# Patient Record
(demographics unavailable — no encounter records)

---

## 2025-01-21 NOTE — PHYSICAL EXAM
[Chaperone Present] : A chaperone was present in the examining room during all aspects of the physical examination [38064] : A chaperone was present during the pelvic exam. [FreeTextEntry2] : Rebeca [FreeTextEntry1] : Void:  100cc  PVR:  10cc  Urethra was prepped in sterile fashion and then a sterile 14F catheter was used by me to drain the bladder for her symptoms of urgency. Patient tolerated the procedure well  GH: 4 PB: 4.5  TVL: 7.5  C: +3  D:  n/a Aa: +3 Ba: +3 Ap: +1  Bp: +1  -empty cough stress test  +atrophy  +urethral caruncle  -vestibular tenderness  +prolapse  +urethral hypermobility  -pelvic floor dysfunction  -urethral tenderness  -bladder tenderness  surgically absent cervix  surgically absent uterus  adnexa nonpalpable  good sphincter tone  -enterocele  decreased rectal squeeze  intact sacral nerves  1/5 Diana

## 2025-01-21 NOTE — REASON FOR VISIT
[TextEntry] : Reason for visit: New Patient Voids per day: 4-6   Voids per night: 2   Urge incontinence: No Stress incontinence: Occasionally Constipation: No Fecal incontinence: Yes Vaginal bulge: Yes

## 2025-01-21 NOTE — ASSESSMENT
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 The patient was counseled regarding the possible natural progression of prolapse and the clinical consequences of worsening prolapse. The stage and the location of the prolapse was reviewed with the patient. She was counseled regarding the management strategies including observation, pelvic floor physical therapy, pessary placement and surgery. As the patient's symptoms are somewhat atypical for prolapse sxs will proceed with a pessary trial to determine if her symptoms improve. She may ultimately be interested in surgical management.  Urinary urgency, frequency, nocturia - Discussed etiology of the condition with the patient. Discussed management options, including first line management options consisting of diet and lifestyle modifications, second line options consisting of medications, and third line options. Reviewed PTNS, bladder botox, and Interstim. Discussed R/B/A of anticholinergics versus b-3 agonists. Patient will start with bladder diet, Getmesa and vaginal estrogen therapy.  Vaginal atrophy - Discussed etiology and treatment options with patient. Discussed R/B/A of estrogen vaginal cream. Patient will start using as follows: Place a pea size (0.5 grams or less) dab of Estrogen vaginal cream using finger (NOT the applicator) into the vagina 3 times a week ( Monday, Wednesday, Friday)

## 2025-01-21 NOTE — HISTORY OF PRESENT ILLNESS
[FreeTextEntry1] : 69 year para 1 ( x1) presents with complaints of vaginal bulge since her hysterectomy in 3/2024. She c/o frequent urination.  Pelvic organ prolapse: + bulge, no pressure/heaviness, duration 6 months  Stress urinary incontinence: no x/week no prior incontinence procedures  Overactive bladder syndrome: daily frequency 4-6 x/day, 2 x/night, occasional urgency,  2-4 x/week UUI episodes, 1 pads/day     Bladder irritants include coffee, tea,    Prior OAB meds no  Voiding dysfunction: no Incomplete bladder emptying, no hesitancy  Lower urinary tract/vaginal symptoms: ? UTIs per year, no hematuria, no dysuria, no bladder pain (no sxs)  7 BM/week   no constipation   Fecal incontinence + (1x/wk) with loose stool  Sexually active no   Pelvic pain no   Vaginal dryness +   LMP age 55   PMB no  PMSH: 3/2024 - Dr. Quiroga ([facility]): LAVH, BSO, perineoplasty (op report scanned into chart)

## 2025-01-21 NOTE — PHYSICAL EXAM
[Chaperone Present] : A chaperone was present in the examining room during all aspects of the physical examination [87077] : A chaperone was present during the pelvic exam. [FreeTextEntry2] : Rebeca [FreeTextEntry1] : Void:  100cc  PVR:  10cc  Urethra was prepped in sterile fashion and then a sterile 14F catheter was used by me to drain the bladder for her symptoms of urgency. Patient tolerated the procedure well  GH: 4 PB: 4.5  TVL: 7.5  C: +3  D:  n/a Aa: +3 Ba: +3 Ap: +1  Bp: +1  -empty cough stress test  +atrophy  +urethral caruncle  -vestibular tenderness  +prolapse  +urethral hypermobility  -pelvic floor dysfunction  -urethral tenderness  -bladder tenderness  surgically absent cervix  surgically absent uterus  adnexa nonpalpable  good sphincter tone  -enterocele  decreased rectal squeeze  intact sacral nerves  1/5 Diana

## 2025-01-21 NOTE — COUNSELING
[FreeTextEntry1] : Please follow up with the physician assistant for a pessary fitting.  Place a pea size (0.5 grams or less) dab of Estrogen vaginal cream using finger (NOT the applicator) into the vagina 3 times a week ( Monday, Wednesday, Friday)  Please start taking Gemtesa for your bladder and let us know if you have any problems picking up the medication from the pharmacy.  For Urgency, Frequency and Urge related incontinence.  Please decrease or stop the use of the followin. Coffee (Caffeinated and decaffeinated)  2. Teas (Caffeinated, decaffeinated, Ice tea, and green teas  3. All sodas (Caffeinated, decaffeinated, energy drinks)  4. All carbonated drinks including seltzer water  5. All citric fruit juices  6. Water with lemon or lime  7. Spicy foods  8. Tomato Sauce based foods  9. Chocolate and chocolate containing products  10. All alcohol

## 2025-01-21 NOTE — PHYSICAL EXAM
[Chaperone Present] : A chaperone was present in the examining room during all aspects of the physical examination [[id number]] : A chaperone was present during the pelvic exam. [FreeTextEntry2] : Rebeca [FreeTextEntry1] : Void:  100cc  PVR:  10cc  Urethra was prepped in sterile fashion and then a sterile 14F catheter was used by me to drain the bladder for her symptoms of urgency. Patient tolerated the procedure well  GH: 4 PB: 4.5  TVL: 7.5  C: +3  D:  n/a Aa: +3 Ba: +3 Ap: +1  Bp: +1  -empty cough stress test  +atrophy  +urethral caruncle  -vestibular tenderness  +prolapse  +urethral hypermobility  -pelvic floor dysfunction  -urethral tenderness  -bladder tenderness  surgically absent cervix  surgically absent uterus  adnexa nonpalpable  good sphincter tone  -enterocele  decreased rectal squeeze  intact sacral nerves  1/5 Diana

## 2025-01-21 NOTE — HISTORY OF PRESENT ILLNESS
[FreeTextEntry1] : 69 year para 1 ( x1) presents with complaints of vaginal bulge since her hysterectomy in 3/2024. She c/o frequent urination.  Pelvic organ prolapse: + bulge, no pressure/heaviness, duration 6 months  Stress urinary incontinence: no x/week no prior incontinence procedures  Overactive bladder syndrome: daily frequency 4-6 x/day, 2 x/night, occasional urgency,  2-4 x/week UUI episodes, 1 pads/day     Bladder irritants include coffee, tea,    Prior OAB meds no  Voiding dysfunction: no Incomplete bladder emptying, no hesitancy  Lower urinary tract/vaginal symptoms: ? UTIs per year, no hematuria, no dysuria, no bladder pain (no sxs)  7 BM/week   no constipation   Fecal incontinence + (1x/wk) with loose stool  Sexually active no   Pelvic pain no   Vaginal dryness +   LMP age 55   PMB no  PMSH: 3/2024 - Dr. Quiroga ([facility]): LAVH, BSO, perineoplasty (op report scanned into chart)

## 2025-01-21 NOTE — HISTORY OF PRESENT ILLNESS
[FreeTextEntry1] : 69 year para 1 ( x1) presents with complaints of vaginal bulge since her hysterectomy in 3/2024. She c/o frequent urination.  Pelvic organ prolapse: + bulge, no pressure/heaviness, duration 6 months  Stress urinary incontinence: no x/week no prior incontinence procedures  Overactive bladder syndrome: daily frequency 4-6 x/day, 2 x/night, occasional urgency,  2-4 x/week UUI episodes, 1 pads/day     Bladder irritants include coffee, tea,    Prior OAB meds no  Voiding dysfunction: no Incomplete bladder emptying, no hesitancy  Lower urinary tract/vaginal symptoms: ? UTIs per year, no hematuria, no dysuria, no bladder pain (no sxs)  7 BM/week   no constipation   Fecal incontinence + (1x/wk) with loose stool  Sexually active no   Pelvic pain no   Vaginal dryness +   LMP age 55   PMB no  PMSH: 3/2024 - Dr. Quiroga ([facility]): LAVH, BSO, perineoplasty (op report scanned into chart)

## 2025-02-18 NOTE — HISTORY OF PRESENT ILLNESS
[de-identified] : 69-year-old female comes in today for an evaluation of her right knee pain that has been going on now for a few days.  No specific injury or trauma. Past medical history hypertension, thyroid disease, high cholesterol, migraines Medications include metoprolol, lovastatin, methimazole, topiramate

## 2025-02-18 NOTE — IMAGING
[de-identified] : On clinical exam of the right knee mild swelling, no ecchymosis, erythema.  Skin is intact.  Tenderness along the medial joint line, no tenderness along the lateral joint line.  Mild tenderness in the posterior knee popliteal fossa.  Calf soft nontender.  Patient is able to straight leg raise.  Crepitus and slight restriction through knee flexion and extension.  X-ray right knee 3 views obtained in the office no obvious fracture or dislocation, mild patellofemoral degenerative change

## 2025-02-18 NOTE — ASSESSMENT
[FreeTextEntry1] : Recommending physical therapy, meloxicam sent to the pharmacy.  We agreed to try cortisone injection.  Today in the office under sterile conditions I injected right knee lateral approach 3 cc of lidocaine, 2 cc of 10 mg dexamethasone.  Patient tolerated it well.  Puncture site was covered with a Band-Aid.  Icing if she has any soreness.  Follow-up in several weeks if the pain worsens or continues could consider further imaging with MRI.

## 2025-02-18 NOTE — HISTORY OF PRESENT ILLNESS
[de-identified] : 69-year-old female comes in today for an evaluation of her right knee pain that has been going on now for a few days.  No specific injury or trauma. Past medical history hypertension, thyroid disease, high cholesterol, migraines Medications include metoprolol, lovastatin, methimazole, topiramate

## 2025-02-18 NOTE — IMAGING
[de-identified] : On clinical exam of the right knee mild swelling, no ecchymosis, erythema.  Skin is intact.  Tenderness along the medial joint line, no tenderness along the lateral joint line.  Mild tenderness in the posterior knee popliteal fossa.  Calf soft nontender.  Patient is able to straight leg raise.  Crepitus and slight restriction through knee flexion and extension.  X-ray right knee 3 views obtained in the office no obvious fracture or dislocation, mild patellofemoral degenerative change

## 2025-06-09 NOTE — HISTORY OF PRESENT ILLNESS
[FreeTextEntry1] : Today patient presents for follow-up visit. She has history of UU/UUI, frequency, nocturia and vaginal vault prolapse. She was last seen on 1/15/25.  From initial visit:  69 year para 1 ( x1) presents with complaints of vaginal bulge since her hysterectomy in 3/2024. She c/o frequent urination.  Pelvic organ prolapse: + bulge, no pressure/heaviness, duration 6 months  Stress urinary incontinence: no x/week no prior incontinence procedures  Overactive bladder syndrome: daily frequency 4-6 x/day, 2 x/night, occasional urgency, 2-4 x/week UUI episodes, 1 pads/day Bladder irritants include coffee, tea, Prior OAB meds no  Voiding dysfunction: no Incomplete bladder emptying, no hesitancy  Lower urinary tract/vaginal symptoms: ? UTIs per year, no hematuria, no dysuria, no bladder pain (no sxs)  7 BM/week no constipation Fecal incontinence + (1x/wk) with loose stool  Sexually active no Pelvic pain no Vaginal dryness + LMP age 55 PMB no  Void: 100cc PVR: 10cc GH: 4 PB: 4.5 TVL: 7.5 C: +3 D: n/a Aa: +3 Ba: +3 Ap: +1 Bp: +1  PMSH: 3/2024 - Dr. Quiroga ([facility]): LAVH, BSO, perineoplasty (op report scanned into chart)  Vaginal estrogen cream - stopped due to left breast pain Gemtesa - not covered  Today, patient was scheduled for a pessary fitting, however she does not want to try a pessary anymore due to her negative experience with pessary use in the past. She continues to have urinary frequency especially at nighttime. She stopped using vaginal estrogen crema due to left breast pain. She denies any other issues.

## 2025-06-09 NOTE — COUNSELING
[FreeTextEntry1] :  If you feel like you have an infection it is important for you to call our office and we will arrange testing of your urine.  Please decrease or stop the use of the followin. Coffee (Caffeinated and decaffeinated)  2. Teas (Caffeinated, decaffeinated, Iced tea, and green teas  3. All sodas (Caffeinated, decaffeinated, energy drinks) 4. All carbonated drinks including seltzer water 5. All citric fruit juices  6. Water with lemon or lime  7. Spicy foods  8. Tomato Sauce based foods  9. Chocolate and chocolate containing products  10. All alcohol  For 4-5 days, cut one item from the list out of your diet.   After 4-5 days, if it makes a difference, you have to decide if you want to keep it in or out of your diet.  If it does not make a difference, you may add it back into your diet and remove another item for another 4-5 days.  Avoid liquid intake 2 hours before bedtime. It is recommended that you take sips of water to take your medications before bedtime.   Elevate your legs throughout the day, particularly 2 hours before bedtime.   We recommend wearing compression stockings during the day to help decrease the night time urination.  Referral for pelvic floor PT was placed. Pelvic Floor Physical Therapy Locations in : -[facility] Physical Therapy [address] [place] [id number]  -[facility] Physical Therapy [address] [place] [address] Phone: [phone]  At home pelvic floor physical therapy: [email or web address]  Please schedule appointment with a gynecologist for a breast exam.   Schedule a 3 month follow up appointment with DANA Lockett, or sooner if you have any issues.

## 2025-06-09 NOTE — DISCUSSION/SUMMARY
[FreeTextEntry1] : Vaginal vault prolapse: Pt refused pessary fitting today. Referral for pelvic floor PT placed. She is not interested in surgical management.  UU/UUI/nocturia/frequency: Gemtesa was not covered by insurance. I have discussed 1st, 2nd and 3rd degree options in length with the patient, and handouts were given. Pt has acute UTI at this time and usually have incontinence when she has UTI. She would like to complete the treatment for UTI and see if her symptoms improved. Otherwise, she will call with her decision on further management.  Pt was advised to see a gynecologist for breast exam. All questions addressed. RTC in 3 mon for follow-up visit, or sooner PRN

## 2025-06-09 NOTE — REASON FOR VISIT
[TextEntry] : Reason for visit: Follow Up Voids per day: 4-6  Voids per night: 2   Urge incontinence: Occasionally Stress incontinence: Occasionally Constipation: No Fecal incontinence: Occasionally Vaginal bulge: Yes

## 2025-06-09 NOTE — COUNSELING
[FreeTextEntry1] :  If you feel like you have an infection it is important for you to call our office and we will arrange testing of your urine.  Please decrease or stop the use of the followin. Coffee (Caffeinated and decaffeinated)  2. Teas (Caffeinated, decaffeinated, Iced tea, and green teas  3. All sodas (Caffeinated, decaffeinated, energy drinks) 4. All carbonated drinks including seltzer water 5. All citric fruit juices  6. Water with lemon or lime  7. Spicy foods  8. Tomato Sauce based foods  9. Chocolate and chocolate containing products  10. All alcohol  For 4-5 days, cut one item from the list out of your diet.   After 4-5 days, if it makes a difference, you have to decide if you want to keep it in or out of your diet.  If it does not make a difference, you may add it back into your diet and remove another item for another 4-5 days.  Avoid liquid intake 2 hours before bedtime. It is recommended that you take sips of water to take your medications before bedtime.   Elevate your legs throughout the day, particularly 2 hours before bedtime.   We recommend wearing compression stockings during the day to help decrease the night time urination.  Referral for pelvic floor PT was placed. Pelvic Floor Physical Therapy Locations in : -[facility] Physical Therapy [address] [place] [id number]  -[facility] Physical Therapy [address] [place] [address] Phone: [phone]  At home pelvic floor physical therapy: [email or web address]  Please schedule appointment with a gynecologist for a breast exam.   Schedule a 3 month follow up appointment with DANA Lockett, or sooner if you have any issues.